# Patient Record
Sex: MALE
[De-identification: names, ages, dates, MRNs, and addresses within clinical notes are randomized per-mention and may not be internally consistent; named-entity substitution may affect disease eponyms.]

---

## 2018-03-22 ENCOUNTER — HOSPITAL ENCOUNTER (OUTPATIENT)
Dept: HOSPITAL 5 - PET | Age: 58
Discharge: HOME | End: 2018-03-22
Attending: UROLOGY
Payer: COMMERCIAL

## 2018-03-22 DIAGNOSIS — N28.89: Primary | ICD-10-CM

## 2018-03-22 DIAGNOSIS — Z90.5: ICD-10-CM

## 2018-03-22 DIAGNOSIS — E11.9: ICD-10-CM

## 2018-03-22 PROCEDURE — 82962 GLUCOSE BLOOD TEST: CPT

## 2018-03-22 PROCEDURE — 78815 PET IMAGE W/CT SKULL-THIGH: CPT

## 2018-03-22 PROCEDURE — A9552 F18 FDG: HCPCS

## 2018-03-25 NOTE — PET REPORT
PET/CT:03/22/18 11:08:00



CLINICAL: Right renal cancer restaging.  Status post right nephrectomy.

RADIOPHARMACEUTICAL: 13.389mCi F18-FDG.





COMPARISON: MRI abdomen 01/31/12



TECHNIQUE-

Following intravenous injection of F-18 FDG and an approximately 60 

minute uptake period,  CT and PET images from the mid skull to the 

upper thighs were acquired with the patient in the fasted state.  No 

contrast was administered.  The CT protocol used for this PET CT study 

is designed for attenuation correction and anatomic localization of PET 

abnormalities.  This  CT is not desired to produce and cannot 

replace, state-of-the-art diagnostic CT scans with specific imaging 

protocols for different body parts and indications.



Plasma glucose at the time of this test: 86g/dl.



The standardized uptake values (SUV) are normalized to patient body 

weight and indicate the highest activity concentration (SUV max) in a 

given disease site.





FINDINGS:



Brain--Physiologic FDG uptake in the visualized regions of the brain.



Neck--Physiologic FDG uptake .



Chest--Physiologic FDG uptake in mediastinal blood pool and myocardium.



Lungs--No abnormal uptake.  No pulmonary nodule or mass. 



Pleura/pericardium--No abnormal uptake.



Thoracic nodes--No abnormal uptake.



Hepatobiliary--No abnormal uptake.  Liver background SUV mean, as a 

reference for comparing FDG studies, is 2.7 .  No liver mass.

      

Spleen--No abnormal uptake.



Pancreas--No abnormal uptake.



Adrenal Glands--No abnormal uptake.



Kidneys/Ureters/Bladder--No abnormal uptake.  Status post right 

nephrectomy.



Abdominopelvic Nodes--No abnormal uptake.



Bowel/Peritoneum/Mesentery--No abnormal uptake.



Pelvic organs--No abnormal uptake.



Bones/Soft Tissues--No abnormal uptake.  No suspicious bone lesion.







IMPRESSION-

      Negative study.No evidence of disease recurrence or metastasis.

## 2019-08-19 ENCOUNTER — HOSPITAL ENCOUNTER (EMERGENCY)
Dept: HOSPITAL 5 - ED | Age: 59
Discharge: HOME | End: 2019-08-19
Payer: SELF-PAY

## 2019-08-19 VITALS — DIASTOLIC BLOOD PRESSURE: 78 MMHG | SYSTOLIC BLOOD PRESSURE: 125 MMHG

## 2019-08-19 DIAGNOSIS — Z79.899: ICD-10-CM

## 2019-08-19 DIAGNOSIS — F41.9: ICD-10-CM

## 2019-08-19 DIAGNOSIS — R07.89: Primary | ICD-10-CM

## 2019-08-19 DIAGNOSIS — Z86.711: ICD-10-CM

## 2019-08-19 DIAGNOSIS — F12.10: ICD-10-CM

## 2019-08-19 DIAGNOSIS — Z90.89: ICD-10-CM

## 2019-08-19 DIAGNOSIS — Z95.0: ICD-10-CM

## 2019-08-19 DIAGNOSIS — F17.200: ICD-10-CM

## 2019-08-19 LAB
BASOPHILS # (AUTO): 0.1 K/MM3 (ref 0–0.1)
BASOPHILS NFR BLD AUTO: 1 % (ref 0–1.8)
BUN SERPL-MCNC: 15 MG/DL (ref 9–20)
BUN/CREAT SERPL: 13 %
CALCIUM SERPL-MCNC: 9.7 MG/DL (ref 8.4–10.2)
EOSINOPHIL # BLD AUTO: 0.1 K/MM3 (ref 0–0.4)
EOSINOPHIL NFR BLD AUTO: 0.9 % (ref 0–4.3)
HCT VFR BLD CALC: 38 % (ref 35.5–45.6)
HEMOLYSIS INDEX: 3
HGB BLD-MCNC: 13.3 GM/DL (ref 11.8–15.2)
LYMPHOCYTES # BLD AUTO: 1.9 K/MM3 (ref 1.2–5.4)
LYMPHOCYTES NFR BLD AUTO: 31.4 % (ref 13.4–35)
MCHC RBC AUTO-ENTMCNC: 35 % (ref 32–34)
MCV RBC AUTO: 87 FL (ref 84–94)
MONOCYTES # (AUTO): 0.5 K/MM3 (ref 0–0.8)
MONOCYTES % (AUTO): 8 % (ref 0–7.3)
PLATELET # BLD: 175 K/MM3 (ref 140–440)
RBC # BLD AUTO: 4.35 M/MM3 (ref 3.65–5.03)

## 2019-08-19 PROCEDURE — 71275 CT ANGIOGRAPHY CHEST: CPT

## 2019-08-19 PROCEDURE — 85025 COMPLETE CBC W/AUTO DIFF WBC: CPT

## 2019-08-19 PROCEDURE — 99285 EMERGENCY DEPT VISIT HI MDM: CPT

## 2019-08-19 PROCEDURE — 85379 FIBRIN DEGRADATION QUANT: CPT

## 2019-08-19 PROCEDURE — 71045 X-RAY EXAM CHEST 1 VIEW: CPT

## 2019-08-19 PROCEDURE — 84484 ASSAY OF TROPONIN QUANT: CPT

## 2019-08-19 PROCEDURE — 93010 ELECTROCARDIOGRAM REPORT: CPT

## 2019-08-19 PROCEDURE — 80048 BASIC METABOLIC PNL TOTAL CA: CPT

## 2019-08-19 PROCEDURE — 36415 COLL VENOUS BLD VENIPUNCTURE: CPT

## 2019-08-19 PROCEDURE — 93005 ELECTROCARDIOGRAM TRACING: CPT

## 2019-08-19 NOTE — EMERGENCY DEPARTMENT REPORT
ED Chest Pain HPI





- General


Chief Complaint: Chest Pain


Stated Complaint: CHEST PAIN


Time Seen by Provider: 08/19/19 11:54


Source: patient, EMS


Mode of arrival: Stretcher


Limitations: No Limitations





- History of Present Illness


Initial Comments: 





58 year old male with a past medical history of pulmonary embolism 6-7 years 

ago, anxiety, and pacemaker placement for bradycardia presents to the hospital 

complaints of right-sided chest pain since 7am.  Patient complaining of right-

sided chest pain or feels like a muscle soreness.  Pain escalates to a sharp 

pain going to the shoulder.  Pain is intermittent.  Is with the palpation.  

Patient has chronic shortness of breath that is unchanged.  He denies nausea, 

vomiting, or diaphoresis.  Patient received aspirin and nitroglycerin spray in 

route to the hospital.  Patient denies a history of CAD/stent placement.  

Patient is a  and continues to smoke cigarettes.  He states he

has not on Eliquis because he could not afford it.  He has an appointment in 3 

days with Dr. Butler cardiologist at [place] to have his pacemaker checked.  He 

currently only takes meds for reflux and an anxiety medication that starts wtih 

a "M".  Patient was taken aspirin 81 mg but ran out last week.


Severity scale (0 -10): 5





- Related Data


                                Home Medications











 Medication  Instructions  Recorded  Confirmed  Last Taken


 


Mirtazapine [Remeron 15mg TAB] 15 mg PO QHS 08/19/19 08/19/19 Unknown


 


Pantoprazole [Protonix] 40 mg PO QDAY 08/19/19 08/19/19 Unknown








                                  Previous Rx's











 Medication  Instructions  Recorded  Last Taken  Type


 


traMADol [Ultram 50 MG tab] 50 mg PO Q6HR PRN #20 tablet 08/19/19 Unknown Rx











                                    Allergies











Allergy/AdvReac Type Severity Reaction Status Date / Time


 


No Known Allergies Allergy   Unverified 03/22/18 11:09














Heart Score





- HEART Score


History: Slightly suspicious


EKG: Normal


Age: 45-65


Risk factors: 1-2 risk factors


Troponin: < normal limit


HEART Score: 2





ED Review of Systems


ROS: 


Stated complaint: CHEST PAIN


Other details as noted in HPI





Comment: All other systems reviewed and negative





ED Past Medical Hx





- Past Medical History


Previous Medical History?: Yes


Hx Pulmonary Embolism: Yes


Additional medical history: anxiety





- Surgical History


Past Surgical History?: Yes


Hx Pacemaker: Yes


Additional Surgical History: nephrectomy





- Social History


Smoking Status: Current Every Day Smoker


Substance Use Type: Marijuana





- Medications


Home Medications: 


                                Home Medications











 Medication  Instructions  Recorded  Confirmed  Last Taken  Type


 


Mirtazapine [Remeron 15mg TAB] 15 mg PO QHS 08/19/19 08/19/19 Unknown History


 


Pantoprazole [Protonix] 40 mg PO QDAY 08/19/19 08/19/19 Unknown History


 


traMADol [Ultram 50 MG tab] 50 mg PO Q6HR PRN #20 tablet 08/19/19  Unknown Rx














ED Physical Exam





- General


Limitations: No Limitations





- Other


Other exam information: 





General: No acute distress


Head: Atraumatic normocephalic


Eyes: Normal appearance, pupils equal reactive to light, extraocular movements 

intact


ENT: Normal oropharynx


Neck: Normal appearance, no C-spine tenderness, no meningismus


Chest: Clear to auscultation bilaterally, no wheezes, rales, or crackles, right 

upper lateral chest wall tenderness adjacent to shoulder


Cardiovascular: Regular rate and rhythm


Abdomen: Soft, nondistended, nontender, no rebound or guarding, normal bowel 

sounds


Back: Normal inspection, nontender


Extremity: Normal inspection, no deformity, full range of motion, full range of 

motion of shoulder, no calf tenderness or leg edema


Neuro: Alert and oriented 3, speech clear, no gross motor or sensory deficit


Psychiatric: Normal affect


Skin: No rash, warmth, or erythema





ED Course


                                   Vital Signs











  08/19/19 08/19/19 08/19/19





  11:41 11:45 12:00


 


Temperature  98.1 F 


 


Pulse Rate 72 63 60


 


Respiratory 13 20 





Rate   


 


Blood Pressure  108/74 109/68


 


O2 Sat by Pulse  98 95





Oximetry   














  08/19/19 08/19/19 08/19/19





  12:20 12:30 13:00


 


Temperature   


 


Pulse Rate  62 60


 


Respiratory 20 13 13





Rate   


 


Blood Pressure  101/71 96/66


 


O2 Sat by Pulse  97 98





Oximetry   














  08/19/19 08/19/19 08/19/19





  13:30 14:00 14:30


 


Temperature   


 


Pulse Rate 60 70 60


 


Respiratory 14 16 15





Rate   


 


Blood Pressure 103/69 104/70 117/69


 


O2 Sat by Pulse 97 96 98





Oximetry   














  08/19/19 08/19/19





  15:13 15:30


 


Temperature  


 


Pulse Rate 60 


 


Respiratory 11 L 





Rate  


 


Blood Pressure 123/74 120/75


 


O2 Sat by Pulse 99 98





Oximetry  














ED Medical Decision Making





- Lab Data


Result diagrams: 


                                 08/19/19 12:18





                                 08/19/19 12:18








                                   Lab Results











  08/19/19 08/19/19 08/19/19 Range/Units





  12:18 12:18 12:18 


 


WBC  6.0    (4.5-11.0)  K/mm3


 


RBC  4.35    (3.65-5.03)  M/mm3


 


Hgb  13.3    (11.8-15.2)  gm/dl


 


Hct  38.0    (35.5-45.6)  %


 


MCV  87    (84-94)  fl


 


MCH  31    (28-32)  pg


 


MCHC  35 H    (32-34)  %


 


RDW  13.1 L    (13.2-15.2)  %


 


Plt Count  175    (140-440)  K/mm3


 


Lymph % (Auto)  31.4    (13.4-35.0)  %


 


Mono % (Auto)  8.0 H    (0.0-7.3)  %


 


Eos % (Auto)  0.9    (0.0-4.3)  %


 


Baso % (Auto)  1.0    (0.0-1.8)  %


 


Lymph #  1.9    (1.2-5.4)  K/mm3


 


Mono #  0.5    (0.0-0.8)  K/mm3


 


Eos #  0.1    (0.0-0.4)  K/mm3


 


Baso #  0.1    (0.0-0.1)  K/mm3


 


Seg Neutrophils %  58.7    (40.0-70.0)  %


 


Seg Neutrophils #  3.5    (1.8-7.7)  K/mm3


 


D-Dimer    396.69 H  (0-234)  ng/mlDDU


 


Sodium   140   (137-145)  mmol/L


 


Potassium   4.4   (3.6-5.0)  mmol/L


 


Chloride   105.7   ()  mmol/L


 


Carbon Dioxide   23   (22-30)  mmol/L


 


Anion Gap   16   mmol/L


 


BUN   15   (9-20)  mg/dL


 


Creatinine   1.2   (0.8-1.5)  mg/dL


 


Estimated GFR   > 60   ml/min


 


BUN/Creatinine Ratio   13   %


 


Glucose   94   ()  mg/dL


 


Calcium   9.7   (8.4-10.2)  mg/dL


 


Troponin T   < 0.010   (0.00-0.029)  ng/mL














  08/19/19 Range/Units





  14:11 


 


WBC   (4.5-11.0)  K/mm3


 


RBC   (3.65-5.03)  M/mm3


 


Hgb   (11.8-15.2)  gm/dl


 


Hct   (35.5-45.6)  %


 


MCV   (84-94)  fl


 


MCH   (28-32)  pg


 


MCHC   (32-34)  %


 


RDW   (13.2-15.2)  %


 


Plt Count   (140-440)  K/mm3


 


Lymph % (Auto)   (13.4-35.0)  %


 


Mono % (Auto)   (0.0-7.3)  %


 


Eos % (Auto)   (0.0-4.3)  %


 


Baso % (Auto)   (0.0-1.8)  %


 


Lymph #   (1.2-5.4)  K/mm3


 


Mono #   (0.0-0.8)  K/mm3


 


Eos #   (0.0-0.4)  K/mm3


 


Baso #   (0.0-0.1)  K/mm3


 


Seg Neutrophils %   (40.0-70.0)  %


 


Seg Neutrophils #   (1.8-7.7)  K/mm3


 


D-Dimer   (0-234)  ng/mlDDU


 


Sodium   (137-145)  mmol/L


 


Potassium   (3.6-5.0)  mmol/L


 


Chloride   ()  mmol/L


 


Carbon Dioxide   (22-30)  mmol/L


 


Anion Gap   mmol/L


 


BUN   (9-20)  mg/dL


 


Creatinine   (0.8-1.5)  mg/dL


 


Estimated GFR   ml/min


 


BUN/Creatinine Ratio   %


 


Glucose   ()  mg/dL


 


Calcium   (8.4-10.2)  mg/dL


 


Troponin T  < 0.010  (0.00-0.029)  ng/mL














- EKG Data


-: EKG Interpreted by Me (atrial paced rhythm)


EKG shows normal: axis (qrs 24), QRS complexes (qrsd 94), ST-T waves (no stemi)





- Radiology Data


Radiology results: report reviewed





CHEST 1 VIEW INDICATION / CLINICAL INFORMATION: sob cp. COMPARISON: None 

available. FINDINGS: SUPPORT DEVICES: Pacemaker is noted HEART / MEDIASTINUM: No

significant abnormality. LUNGS / PLEURA: No significant pulmonary or pleural 

abnormality.. No pneumothorax. ADDITIONAL FINDINGS: No significant additional 

findings. IMPRESSION: 1. No acute findings. 





CTA CHEST WITH CONTRAST INDICATION : Right chest pain and shortness of breath 

for one day. History of pulmonary embolus. TECHNIQUE: Axial imaging performed th

rough the chest, with contrast bolus timing set to maximize opacification of the

pulmonary arteries. Sagittal and coronal reformatted images. 3-plane MIP 

reformatted images were obtained. All CT scans at this location are performed 

using CT dose reduction for ALARA by means of automated exposure control. 100 mL

of intravenous contrast administered. COMPARISON: 1/18/2012 CT chest with 

contrast. FINDINGS: Bolus: Contrast bolus timing is adequate. PTE: No filling 

defect is present to suggest PTE. Mediastinum: Heart and great vessels appear 

normal. No pathologic mediastinal adenopathy. A 2-lead pacemaker device is in 

position. Lungs: Lungs are clear. Bones: Degenerative changes in the spine with 

nothing acute. Upper abdomen: Limited imaging of the upper abdomen shows nothing

acute. IMPRESSION: Negative for PTE. Clear lungs. 





- Medical Decision Making





Patient presents with right sided chest pain with a reproducible component.  

Mild elevation of d-dimer without signs of hypoxia, tachycardia, and a CT 

angiogram chest is negative for pulmonary embolus.  EKG shows a paced rhythm 

without ST elevation and troponin negative 2.  Patient will be treated sy

mptomatically for musculoskeltal chest pain and follow-up with PMD advised.





- Differential Diagnosis


muscle strain, shoulder strain, PE, costochondritis, pneumothorax


Critical Care Time: No


Critical care attestation.: 


If time is entered above; I have spent that time in minutes in the direct care 

of this critically ill patient, excluding procedure time.








ED Disposition


Clinical Impression: 


 Musculoskeletal chest pain, Pacemaker





Disposition: DC-01 TO HOME OR SELFCARE


Is pt being admited?: No


Does the pt Need Aspirin: No


Condition: Stable


Instructions:  Chest Pain (ED)


Additional Instructions: 


Take the medications as prescribed.  Follow-up with your doctor or with a 

doctor/clinic  provided.  Return is symptoms worsen as indicated by the 

discharge instructions.  Tramadol may cause drowsiness therefore do not drive or

operate heavy machinery while taking tramadol


Prescriptions: 


traMADol [Ultram 50 MG tab] 50 mg PO Q6HR PRN #20 tablet


 PRN Reason: Pain


Referrals: 


[facility], [facility] [Primary Care Provider] - 3-5 Days


Time of Disposition: 15:53

## 2019-08-19 NOTE — CAT SCAN REPORT
CTA CHEST WITH CONTRAST



INDICATION : Right chest pain and shortness of breath for one day.  History of pulmonary embolus.



TECHNIQUE:  Axial imaging performed through the chest, with contrast bolus timing set to maximize opa
cification of the pulmonary arteries. Sagittal and coronal reformatted images. 3-plane MIP reformatte
d images were obtained.  All CT scans at this location are performed using CT dose reduction for ALAR
A by means of automated exposure control. 



100 mL of intravenous contrast administered.



COMPARISON:  1/18/2012 CT chest with contrast.



FINDINGS:  



Bolus:  Contrast bolus timing is adequate.

PTE:  No filling defect is present to suggest PTE.

Mediastinum:  Heart and great vessels appear normal.  No pathologic mediastinal adenopathy. A 2-lead 
pacemaker device is in position.

Lungs:  Lungs are clear.

Bones:  Degenerative changes in the spine with nothing acute.



Upper abdomen:  Limited imaging of the upper abdomen shows nothing acute.





IMPRESSION:

 Negative for PTE.  Clear lungs.



Signer Name: Luis Capps Jr, MD 

Signed: 8/19/2019 3:11 PM

 Workstation Name: [id number]

## 2019-08-19 NOTE — XRAY REPORT
CHEST 1 VIEW 



INDICATION / CLINICAL INFORMATION:

sob cp.



COMPARISON: 

None available.



FINDINGS:



SUPPORT DEVICES: Pacemaker is noted

HEART / MEDIASTINUM: No significant abnormality. 

LUNGS / PLEURA: No significant pulmonary or pleural abnormality..  No pneumothorax. 



ADDITIONAL FINDINGS: No significant additional findings.



IMPRESSION:

1. No acute findings.



Signer Name: Alan Werner MD 

Signed: 8/19/2019 12:47 PM

 Workstation Name: [id number]